# Patient Record
(demographics unavailable — no encounter records)

---

## 2024-10-15 NOTE — ASSESSMENT
[FreeTextEntry1] : 82M   Exertional Chest Pain - higher suspicion for obstructive CAD Aortic Atherosclerosis  Carotid Plaque bilateral 20-49% March 2024 Former Smoker quit in his 40s  Emphysema - noted on CT.  No formal diagnosis.   Hypothyroidism HLD -   EKG NSR, LAFB, PVC  - continue baby aspirin 81mg and crestor 20mg daily.   - continue toprol xl 25mg daily for angina  - ECHO unremarkable, mil/mod b/l carotid plaque, aortic athero no AAA on US.   - reviewed treadmill stress echo - 4.6 METS, no ischemic ekg changes, ECHO images non diagnostic, PVCs noted, - plan for coronary CTA

## 2024-10-15 NOTE — CARDIOLOGY SUMMARY
[de-identified] : Treadmill Exercise Stress Echocardiogram 4/23/24: 1. The echocardiogram is non-diagnostic due to inadequate heart rate and sub-optimal stress imaging. 2. No EKG or echocardiographic evidence of exercise induced ischemia. 3. Occasional PVC's noted throughout testing period. 4. No chest pain. Protocol: Modified Evan Pre-Stress: At rest, the electrocardiogram revealed sinus rhythm with occasional premature ventricular contractions and no significant ST changes. Post-Stress: No ischemic ST segment changes. METS Achieved: 4.6 Stage Reached: 1 Exercise Duration: 7 min and 6 sec. Heart Rate: Rest: 72 bpm, Peak: 122 bpm (89 % max predicted) HR Response: Normal Blood Pressure: Rest: 126/69 mmHg, Peak: 168/59 mmHg. BP Response: Physiologic Exercise Capacity: Average Pretest Chest Pain: None Angina: No chest pain during exercise Reason for Termination: Generalized fatigue and Dyspnea Baseline Echocardiogram: Baseline/resting echocardiogram reveals normal left ventricular segmental wall motion and systolic function. At rest, the baseline left ventricular ejection fraction was was estimated at approximately 65 to 70% (normal EF).  Stress Echocardiogram: The echocardiogram is non-diagnostic due to inadequate heart rate and systolic BP product achieved. Stress echocardiogram reveals normal left ventricular segmental wall motion and systolic function.  Summary section only: The echocardiogram is non-diagnostic due to inadequate heart rate and systolic BP product achieved.

## 2024-10-15 NOTE — CARDIOLOGY SUMMARY
[de-identified] : Treadmill Exercise Stress Echocardiogram 4/23/24: 1. The echocardiogram is non-diagnostic due to inadequate heart rate and sub-optimal stress imaging. 2. No EKG or echocardiographic evidence of exercise induced ischemia. 3. Occasional PVC's noted throughout testing period. 4. No chest pain. Protocol: Modified Evan Pre-Stress: At rest, the electrocardiogram revealed sinus rhythm with occasional premature ventricular contractions and no significant ST changes. Post-Stress: No ischemic ST segment changes. METS Achieved: 4.6 Stage Reached: 1 Exercise Duration: 7 min and 6 sec. Heart Rate: Rest: 72 bpm, Peak: 122 bpm (89 % max predicted) HR Response: Normal Blood Pressure: Rest: 126/69 mmHg, Peak: 168/59 mmHg. BP Response: Physiologic Exercise Capacity: Average Pretest Chest Pain: None Angina: No chest pain during exercise Reason for Termination: Generalized fatigue and Dyspnea Baseline Echocardiogram: Baseline/resting echocardiogram reveals normal left ventricular segmental wall motion and systolic function. At rest, the baseline left ventricular ejection fraction was was estimated at approximately 65 to 70% (normal EF).  Stress Echocardiogram: The echocardiogram is non-diagnostic due to inadequate heart rate and systolic BP product achieved. Stress echocardiogram reveals normal left ventricular segmental wall motion and systolic function.  Summary section only: The echocardiogram is non-diagnostic due to inadequate heart rate and systolic BP product achieved.

## 2024-10-15 NOTE — HISTORY OF PRESENT ILLNESS
[FreeTextEntry1] : 82M former smoker quit age 40, here for initial visit today. Referred by PCP Dr. Timothy Mcfadden due to complaint of chest pain accompanied by jaw pain during physical exertion or emotional stress over the past 18 months or so.  Had mild COVID infection June 2023, he thinks symptoms started after infection. 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  8/9/23.   Most severe episode was 6 months ago after walking 1/4 mile.  He has as a result of these symptoms walked less during this time.  Used to walk 2 miles in 35 mins, now does 1 mile in ~ 30 mins.   He took statin medication for many years but self discontinued.   Saw cardiologist many years ago thinks he had a stress test as far as he knows it was normal. CTA chest from 2018 with significant aortic athero, emphysematous changes in lungs.   Social wine use 2-3 days a week 1 or 2 glasses.   4/18/24:  unchanged exertional chest pain after  ~ 1/4 mile.  no rest pain.  no sob` 10/15/24:  exertional angina he thinks has improved somewhat.  compliant with meds.

## 2025-01-28 NOTE — ASSESSMENT
[FreeTextEntry1] : 83M   Exertional Chest Pain - found to have severe RCA stenosis now s/p PCI 1/15/2025 prox mid RCA at [facility] Aortic Atherosclerosis  Carotid Plaque bilateral 20-49% March 2024 Former Smoker quit in his 40s  Emphysema - noted on CT.  No formal diagnosis.   Hypothyroidism HLD -   EKG CAD post PCI -  NSR ECHO unremarkable, mil/mod b/l carotid plaque, aortic athero no AAA on US.   Treadmill stress echo - 4.6 METS, no ischemic ekg changes, ECHO images non diagnostic, PVCs noted,  - continue baby aspirin 81mg and plavix 75mg, and crestor 20mg daily for severe CAD s/p RCA PCI   - continue toprol xl 25mg daily for HTN/CAD - discussed cardiac rehab, he deferred given his time needed caring for wife with Parkinsons.

## 2025-01-28 NOTE — CARDIOLOGY SUMMARY
[de-identified] : Treadmill Exercise Stress Echocardiogram 4/23/24: 1. The echocardiogram is non-diagnostic due to inadequate heart rate and sub-optimal stress imaging. 2. No EKG or echocardiographic evidence of exercise induced ischemia. 3. Occasional PVC's noted throughout testing period. 4. No chest pain. Protocol: Modified Evan Pre-Stress: At rest, the electrocardiogram revealed sinus rhythm with occasional premature ventricular contractions and no significant ST changes. Post-Stress: No ischemic ST segment changes. METS Achieved: 4.6 Stage Reached: 1 Exercise Duration: 7 min and 6 sec. Heart Rate: Rest: 72 bpm, Peak: 122 bpm (89 % max predicted) HR Response: Normal Blood Pressure: Rest: 126/69 mmHg, Peak: 168/59 mmHg. BP Response: Physiologic Exercise Capacity: Average Pretest Chest Pain: None Angina: No chest pain during exercise Reason for Termination: Generalized fatigue and Dyspnea Baseline Echocardiogram: Baseline/resting echocardiogram reveals normal left ventricular segmental wall motion and systolic function. At rest, the baseline left ventricular ejection fraction was was estimated at approximately 65 to 70% (normal EF).  Stress Echocardiogram: The echocardiogram is non-diagnostic due to inadequate heart rate and systolic BP product achieved. Stress echocardiogram reveals normal left ventricular segmental wall motion and systolic function.  Summary section only: The echocardiogram is non-diagnostic due to inadequate heart rate and systolic BP product achieved.

## 2025-01-28 NOTE — HISTORY OF PRESENT ILLNESS
[FreeTextEntry1] : 83M former smoker quit age 40, here for initial visit today. Referred by PCP Dr. Timothy Mcfadden due to complaint of chest pain accompanied by jaw pain during physical exertion or emotional stress over the past 18 months or so.  Had mild COVID infection June 2023, he thinks symptoms started after infection. 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  8/9/23.   Most severe episode was 6 months ago after walking 1/4 mile.  He has as a result of these symptoms walked less during this time.  Used to walk 2 miles in 35 mins, now does 1 mile in ~ 30 mins.   He took statin medication for many years but self discontinued.   Saw cardiologist many years ago thinks he had a stress test as far as he knows it was normal. CTA chest from 2018 with significant aortic athero, emphysematous changes in lungs.   Social wine use 2-3 days a week 1 or 2 glasses.   4/18/24:  unchanged exertional chest pain after  ~ 1/4 mile.  no rest pain.  no sob` 10/15/24:  exertional angina he thinks has improved somewhat.  compliant with meds.  1/28/25: since last visit had PCI of complex RCA stenosis at [facility], no complications.   hasnt gotten back to his normal exercise routine but has felt no cp or sob.  PCI 1/15 discharged on 1/16 on aspirin/plavix.  R groin access site minimally sore but improved.  No leg pain/swelling.  R radial site asymptomatic

## 2025-04-24 NOTE — HISTORY OF PRESENT ILLNESS
[FreeTextEntry1] : 83M former smoker quit age 40, here for initial visit today. Referred by PCP Dr. Timothy Mcfadden due to complaint of chest pain accompanied by jaw pain during physical exertion or emotional stress over the past 18 months or so.  Had mild COVID infection June 2023, he thinks symptoms started after infection. 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  8/9/23.   Most severe episode was 6 months ago after walking 1/4 mile.  He has as a result of these symptoms walked less during this time.  Used to walk 2 miles in 35 mins, now does 1 mile in ~ 30 mins.   He took statin medication for many years but self discontinued.   Saw cardiologist many years ago thinks he had a stress test as far as he knows it was normal. CTA chest from 2018 with significant aortic athero, emphysematous changes in lungs.   Social wine use 2-3 days a week 1 or 2 glasses.   4/18/24:  unchanged exertional chest pain after  ~ 1/4 mile.  no rest pain.  no sob` 10/15/24:  exertional angina he thinks has improved somewhat.  compliant with meds.  1/28/25: since last visit had PCI of complex RCA stenosis at [facility], no complications.   hasnt gotten back to his normal exercise routine but has felt no cp or sob.  PCI 1/15 discharged on 1/16 on aspirin/plavix.  R groin access site minimally sore but improved.  No leg pain/swelling.  R radial site asymptomatic 4/24/25:  feeling well, no cp or sob.  no palps/dizziness

## 2025-04-24 NOTE — CARDIOLOGY SUMMARY
[de-identified] : Treadmill Exercise Stress Echocardiogram 4/23/24: 1. The echocardiogram is non-diagnostic due to inadequate heart rate and sub-optimal stress imaging. 2. No EKG or echocardiographic evidence of exercise induced ischemia. 3. Occasional PVC's noted throughout testing period. 4. No chest pain. Protocol: Modified Evan Pre-Stress: At rest, the electrocardiogram revealed sinus rhythm with occasional premature ventricular contractions and no significant ST changes. Post-Stress: No ischemic ST segment changes. METS Achieved: 4.6 Stage Reached: 1 Exercise Duration: 7 min and 6 sec. Heart Rate: Rest: 72 bpm, Peak: 122 bpm (89 % max predicted) HR Response: Normal Blood Pressure: Rest: 126/69 mmHg, Peak: 168/59 mmHg. BP Response: Physiologic Exercise Capacity: Average Pretest Chest Pain: None Angina: No chest pain during exercise Reason for Termination: Generalized fatigue and Dyspnea Baseline Echocardiogram: Baseline/resting echocardiogram reveals normal left ventricular segmental wall motion and systolic function. At rest, the baseline left ventricular ejection fraction was was estimated at approximately 65 to 70% (normal EF).  Stress Echocardiogram: The echocardiogram is non-diagnostic due to inadequate heart rate and systolic BP product achieved. Stress echocardiogram reveals normal left ventricular segmental wall motion and systolic function.  Summary section only: The echocardiogram is non-diagnostic due to inadequate heart rate and systolic BP product achieved.

## 2025-04-24 NOTE — ASSESSMENT
[FreeTextEntry1] : 83M   Exertional Chest Pain - found to have severe RCA stenosis now s/p PCI 1/15/2025 prox mid RCA at [facility] Aortic Atherosclerosis  Carotid Plaque bilateral 20-49% March 2024 Former Smoker quit in his 40s  Emphysema - noted on CT.  No formal diagnosis.   Hypothyroidism HLD -   EKG CAD post PCI -  NSR ECHO unremarkable, mil/mod b/l carotid plaque, aortic athero no AAA on US.   Treadmill stress echo - 4.6 METS, no ischemic ekg changes, ECHO images non diagnostic, PVCs noted,  - continue baby aspirin 81mg and plavix 75mg, increase crestor to 40mg daily for severe CAD s/p RCA PCI.  No anginal symptoms.  LDL 79 not at goal.   - continue toprol xl 25mg daily for HTN/CAD - deferred cardiac rehab again, given his time needed caring for wife with Parkinsons.